# Patient Record
Sex: FEMALE | Race: BLACK OR AFRICAN AMERICAN | NOT HISPANIC OR LATINO | Employment: OTHER | URBAN - METROPOLITAN AREA
[De-identification: names, ages, dates, MRNs, and addresses within clinical notes are randomized per-mention and may not be internally consistent; named-entity substitution may affect disease eponyms.]

---

## 2020-09-15 PROBLEM — M75.82 ROTATOR CUFF TENDINITIS, LEFT: Status: ACTIVE | Noted: 2020-09-15

## 2020-09-15 PROBLEM — I10 ESSENTIAL HYPERTENSION: Status: ACTIVE | Noted: 2020-09-15

## 2020-09-15 PROBLEM — G89.29 CHRONIC LOW BACK PAIN: Status: ACTIVE | Noted: 2020-09-15

## 2020-09-15 PROBLEM — F32.9 MAJOR DEPRESSION, CHRONIC: Status: ACTIVE | Noted: 2020-09-15

## 2020-09-15 PROBLEM — Z28.21 INFLUENZA VACCINE REFUSED: Status: ACTIVE | Noted: 2020-09-15

## 2020-09-15 PROBLEM — M54.50 CHRONIC LOW BACK PAIN: Status: ACTIVE | Noted: 2020-09-15

## 2020-09-15 PROBLEM — F43.21 GRIEF REACTION: Status: ACTIVE | Noted: 2020-09-15

## 2020-10-13 PROBLEM — E66.01 CLASS 3 SEVERE OBESITY WITH BODY MASS INDEX (BMI) OF 40.0 TO 44.9 IN ADULT: Status: ACTIVE | Noted: 2020-10-13

## 2020-10-13 PROBLEM — Z12.31 SCREENING MAMMOGRAM, ENCOUNTER FOR: Status: ACTIVE | Noted: 2020-10-13

## 2020-10-13 PROBLEM — Z23 NEED FOR INFLUENZA VACCINATION: Status: ACTIVE | Noted: 2020-10-13

## 2021-01-14 PROBLEM — N89.8 VAGINAL DISCHARGE: Status: ACTIVE | Noted: 2021-01-14

## 2021-01-14 PROBLEM — R30.0 DYSURIA: Status: ACTIVE | Noted: 2021-01-14

## 2021-04-28 DIAGNOSIS — I10 ESSENTIAL HYPERTENSION: ICD-10-CM

## 2021-05-02 PROBLEM — R05.9 COUGH: Status: ACTIVE | Noted: 2021-05-02

## 2021-05-02 PROBLEM — Z00.00 PREVENTATIVE HEALTH CARE: Status: ACTIVE | Noted: 2021-05-02

## 2021-08-02 PROBLEM — Z00.00 PREVENTATIVE HEALTH CARE: Status: RESOLVED | Noted: 2021-05-02 | Resolved: 2021-08-02

## 2022-04-22 PROBLEM — G25.2 RESTING TREMOR: Status: ACTIVE | Noted: 2022-04-22

## 2022-06-06 PROBLEM — M54.16 LUMBAR RADICULOPATHY: Status: ACTIVE | Noted: 2022-06-06

## 2023-01-03 PROBLEM — G25.0 FAMILIAL TREMOR: Status: ACTIVE | Noted: 2023-01-03

## 2023-01-06 PROBLEM — N92.1 MENORRHAGIA WITH IRREGULAR CYCLE: Status: ACTIVE | Noted: 2023-01-06

## 2023-01-06 PROBLEM — N39.0 UTI (URINARY TRACT INFECTION): Status: ACTIVE | Noted: 2023-01-06

## 2023-01-10 PROBLEM — K63.5 POLYP OF COLON: Status: ACTIVE | Noted: 2023-01-10

## 2023-04-04 PROBLEM — N93.9 ABNORMAL UTERINE BLEEDING (AUB): Status: ACTIVE | Noted: 2023-04-04

## 2023-04-05 PROBLEM — Z98.890 STATUS POST HYSTEROSCOPY: Status: ACTIVE | Noted: 2023-04-05

## 2023-04-05 PROBLEM — Z98.890 S/P DILATION AND CURETTAGE: Status: ACTIVE | Noted: 2023-04-05

## 2023-04-10 PROBLEM — N39.0 UTI (URINARY TRACT INFECTION): Status: RESOLVED | Noted: 2023-01-06 | Resolved: 2023-04-10

## 2023-04-12 PROBLEM — Z01.818 PREOPERATIVE CLEARANCE: Status: ACTIVE | Noted: 2023-04-12

## 2023-04-12 PROBLEM — D50.9 IRON DEFICIENCY ANEMIA: Status: ACTIVE | Noted: 2023-04-12

## 2023-10-02 PROBLEM — M54.30 SCIATICA: Status: ACTIVE | Noted: 2023-10-02

## 2023-10-02 PROBLEM — F43.10 PTSD (POST-TRAUMATIC STRESS DISORDER): Status: ACTIVE | Noted: 2023-10-02

## 2023-10-03 ENCOUNTER — SOCIAL WORK (OUTPATIENT)
Dept: ADMINISTRATIVE | Facility: OTHER | Age: 48
End: 2023-10-03

## 2023-10-03 NOTE — PROGRESS NOTES
Sw received a consult to assist with counseling. Sw called and spoke to Patient ([phone]). She reports she would like to focus on her medicines at this time. Sw encouraged Patient to contact the Clinic if she decides she would like to try counseling. Patient verbalized understanding.    Amaya Brown LCSW    [phone]

## 2024-02-22 ENCOUNTER — SOCIAL WORK (OUTPATIENT)
Dept: ADMINISTRATIVE | Facility: OTHER | Age: 49
End: 2024-02-22

## 2024-02-22 NOTE — PROGRESS NOTES
Sw received a consult to assist with counseling services. Sw called and spoke to Patient ([phone]). She is agreeable to counseling. William faxed a referral to [facility] to review.    Amaya Brown LCSW    [phone]

## 2024-02-28 ENCOUNTER — SOCIAL WORK (OUTPATIENT)
Dept: ADMINISTRATIVE | Facility: OTHER | Age: 49
End: 2024-02-28

## 2024-02-28 NOTE — PROGRESS NOTES
Willima called [facility] ([phone]) to follow-up on the counseling referral. A message was left requesting an update on the referral.    Amaya Brown LCSW    [phone]

## 2024-03-04 ENCOUNTER — SOCIAL WORK (OUTPATIENT)
Dept: ADMINISTRATIVE | Facility: OTHER | Age: 49
End: 2024-03-04

## 2024-03-04 NOTE — PROGRESS NOTES
William called [facility] ([phone]) to follow-up on the counseling referral. A message was left requesting an update on the referral.    Amaya Brown LCSW    [phone]

## 2024-03-06 ENCOUNTER — SOCIAL WORK (OUTPATIENT)
Dept: ADMINISTRATIVE | Facility: OTHER | Age: 49
End: 2024-03-06

## 2024-03-06 NOTE — PROGRESS NOTES
William has not heard back from [facility] regarding the counseling referral. William faxed the referral to [facility] to review.    Amaya Brown LCSW    [phone]

## 2024-03-08 ENCOUNTER — SOCIAL WORK (OUTPATIENT)
Dept: ADMINISTRATIVE | Facility: OTHER | Age: 49
End: 2024-03-08

## 2024-03-08 NOTE — PROGRESS NOTES
Sw contacted [facility] ([phone]). A message was left requesting an update on the referral.    Amaya Brown LCSW    [phone]

## 2024-03-11 ENCOUNTER — SOCIAL WORK (OUTPATIENT)
Dept: ADMINISTRATIVE | Facility: OTHER | Age: 49
End: 2024-03-11

## 2024-03-11 NOTE — PROGRESS NOTES
Sw received a message from [facility]. Patient has been contacted and scheduled an assessment.    [facility]  [id number]  ANA Huang  [phone]  Appt:  3/13/2024 at 1:00 pm    Amaya Brown LCSW    [phone]

## 2024-04-10 PROBLEM — F43.21 GRIEF REACTION: Status: RESOLVED | Noted: 2020-09-15 | Resolved: 2024-04-10
